# Patient Record
Sex: MALE | Race: WHITE | Employment: FULL TIME | URBAN - METROPOLITAN AREA
[De-identification: names, ages, dates, MRNs, and addresses within clinical notes are randomized per-mention and may not be internally consistent; named-entity substitution may affect disease eponyms.]

---
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
## 2019-07-22 ENCOUNTER — OFFICE VISIT (OUTPATIENT)
Dept: FAMILY MEDICINE CLINIC | Facility: CLINIC | Age: 19
End: 2019-07-22
Payer: COMMERCIAL

## 2019-07-22 VITALS
SYSTOLIC BLOOD PRESSURE: 100 MMHG | HEART RATE: 60 BPM | TEMPERATURE: 99 F | DIASTOLIC BLOOD PRESSURE: 68 MMHG | BODY MASS INDEX: 28 KG/M2 | WEIGHT: 188.19 LBS | OXYGEN SATURATION: 99 % | RESPIRATION RATE: 18 BRPM

## 2019-07-22 DIAGNOSIS — J45.909 MODERATE ASTHMA, UNSPECIFIED WHETHER COMPLICATED, UNSPECIFIED WHETHER PERSISTENT: Primary | ICD-10-CM

## 2019-07-22 DIAGNOSIS — R63.4 WEIGHT LOSS: ICD-10-CM

## 2019-07-22 DIAGNOSIS — J30.2 SEASONAL ALLERGIES: ICD-10-CM

## 2019-07-22 PROCEDURE — 99213 OFFICE O/P EST LOW 20 MIN: CPT | Performed by: NURSE PRACTITIONER

## 2019-07-22 RX ORDER — ALBUTEROL SULFATE 2.5 MG/3ML
2.5 SOLUTION RESPIRATORY (INHALATION) EVERY 4 HOURS PRN
Qty: 1 CONTAINER | Refills: 3 | Status: SHIPPED | OUTPATIENT
Start: 2019-07-22 | End: 2019-07-22

## 2019-07-22 RX ORDER — ALBUTEROL SULFATE 90 UG/1
2 AEROSOL, METERED RESPIRATORY (INHALATION) EVERY 4 HOURS PRN
Qty: 1 INHALER | Refills: 2 | Status: SHIPPED | OUTPATIENT
Start: 2019-07-22 | End: 2019-07-22

## 2019-07-22 RX ORDER — FLUTICASONE PROPIONATE 50 MCG
2 SPRAY, SUSPENSION (ML) NASAL DAILY
Qty: 1 BOTTLE | Refills: 0 | Status: SHIPPED | OUTPATIENT
Start: 2019-07-22 | End: 2019-08-21

## 2019-07-22 RX ORDER — PREDNISONE 20 MG/1
TABLET ORAL
Qty: 10 TABLET | Refills: 0 | Status: SHIPPED | OUTPATIENT
Start: 2019-07-22 | End: 2019-09-27 | Stop reason: ALTCHOICE

## 2019-07-22 RX ORDER — ALBUTEROL SULFATE 2.5 MG/3ML
2.5 SOLUTION RESPIRATORY (INHALATION) EVERY 4 HOURS PRN
Qty: 1 CONTAINER | Refills: 3 | Status: SHIPPED | OUTPATIENT
Start: 2019-07-22

## 2019-07-22 RX ORDER — ALBUTEROL SULFATE 90 UG/1
2 AEROSOL, METERED RESPIRATORY (INHALATION) EVERY 4 HOURS PRN
Qty: 1 INHALER | Refills: 2 | Status: SHIPPED | OUTPATIENT
Start: 2019-07-22

## 2019-07-22 NOTE — PATIENT INSTRUCTIONS
Understanding Asthma    Asthma causes swelling and narrowing of the airways in your lungs. Medical experts are not exactly sure what causes asthma. It is believed to be caused by a mix of inherited and environmental factors.   Healthy lungs  Inside the pradip Moderate flare-ups  When sensitive airways are irritated by a trigger, the muscles around the airways tighten. The lining of the airways swells. Thick, sticky mucus increases and partly clogs the airways.  All of this makes you work harder to keep breathing

## 2019-07-22 NOTE — PROGRESS NOTES
CHIEF COMPLAINT:   \" Patient presents with:  Asthma    HPI:   Nancy Garcia is a 20year old male who presents with a hx of an exacerbation of Asthma. Mother accompanies patient.   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 Dispense: 1 Inhaler; Refill: 2  - albuterol sulfate (2.5 MG/3ML) 0.083% Inhalation Nebu Soln; Take 3 mL (2.5 mg total) by nebulization every 4 (four) hours as needed for Wheezing.   Dispense: 1 Container; Refill: 3    2

## 2019-09-27 ENCOUNTER — OFFICE VISIT (OUTPATIENT)
Dept: FAMILY MEDICINE CLINIC | Facility: CLINIC | Age: 19
End: 2019-09-27
Payer: COMMERCIAL

## 2019-09-27 VITALS
TEMPERATURE: 98 F | BODY MASS INDEX: 27.7 KG/M2 | OXYGEN SATURATION: 99 % | RESPIRATION RATE: 16 BRPM | WEIGHT: 187 LBS | HEIGHT: 69 IN | HEART RATE: 82 BPM | DIASTOLIC BLOOD PRESSURE: 70 MMHG | SYSTOLIC BLOOD PRESSURE: 110 MMHG

## 2019-09-27 DIAGNOSIS — R09.81 NASAL CONGESTION: Primary | ICD-10-CM

## 2019-09-27 PROCEDURE — 99213 OFFICE O/P EST LOW 20 MIN: CPT | Performed by: PHYSICIAN ASSISTANT

## 2019-09-27 RX ORDER — FLUTICASONE PROPIONATE 50 MCG
SPRAY, SUSPENSION (ML) NASAL
Qty: 1 BOTTLE | Refills: 0 | Status: SHIPPED | OUTPATIENT
Start: 2019-09-27

## 2019-09-27 NOTE — PROGRESS NOTES
CHIEF COMPLAINT:   Patient presents with:  Nasal Congestion      HPI:   Natasha Weiner is a 20year old male who presents for nasal congestion symptoms for 2 days. Symptoms have progressed into mild sinus pressure mostly at the bridge of the nose. 2004    for undescended testes      Family History   Problem Relation Age of Onset   • Hypertension Maternal Grandmother    • Diabetes Maternal Grandmother    • Hypertension Maternal Grandfather    • Other (Other[other]) Brother         anxiety, depression Refills for this Visit:  Requested Prescriptions     Signed Prescriptions Disp Refills   • Fluticasone Propionate (FLONASE) 50 MCG/ACT Nasal Suspension 1 Bottle 0     Si sprays in each nostril once daily.    • Loratadine-Pseudoephedrine ER (CLARITIN-D 1

## 2020-03-24 DIAGNOSIS — J45.909 MODERATE ASTHMA, UNSPECIFIED WHETHER COMPLICATED, UNSPECIFIED WHETHER PERSISTENT: ICD-10-CM

## 2020-03-24 NOTE — TELEPHONE ENCOUNTER
ALL protocols failed     Last RX not filled in office     Please advise,    LOV:3/21/17 SD  FOV:none on file   LAST RX:7/22/19 2 puffs into the lungs every 4 hours as needed 1 inhaler 2 refills   LAST LABS:12/7/18 strep A Assay  PER PROTOCOL: to provider

## 2020-03-24 NOTE — TELEPHONE ENCOUNTER
Attempted to reach patient. Last ov >3 yrs ago. Voicemail not set up and no answer x 3 so far this am.  Would like to speak to patient about need for ov once covid restrictions are lifted.    Will attempt to reach patient later today before refilling pre

## 2020-03-26 RX ORDER — ALBUTEROL SULFATE 90 UG/1
AEROSOL, METERED RESPIRATORY (INHALATION)
Qty: 1 INHALER | Refills: 0 | OUTPATIENT
Start: 2020-03-26

## 2020-03-26 NOTE — TELEPHONE ENCOUNTER
Multiple attempts to reach patient today. Unable to leave voice mail. Will ask patient to call the office through the pharmacy. Inhaler was refused at this time.    Last ov March 2017

## 2020-05-27 ENCOUNTER — HOSPITAL ENCOUNTER (EMERGENCY)
Facility: HOSPITAL | Age: 20
Discharge: HOME | End: 2020-05-27
Attending: EMERGENCY MEDICINE
Payer: COMMERCIAL

## 2020-05-27 VITALS
RESPIRATION RATE: 18 BRPM | TEMPERATURE: 99 F | DIASTOLIC BLOOD PRESSURE: 106 MMHG | OXYGEN SATURATION: 99 % | WEIGHT: 196 LBS | HEIGHT: 69 IN | HEART RATE: 91 BPM | BODY MASS INDEX: 29.03 KG/M2 | SYSTOLIC BLOOD PRESSURE: 136 MMHG

## 2020-05-27 DIAGNOSIS — L30.9 ECZEMA, UNSPECIFIED TYPE: Primary | ICD-10-CM

## 2020-05-27 DIAGNOSIS — L20.9 ATOPIC DERMATITIS, UNSPECIFIED TYPE: ICD-10-CM

## 2020-05-27 PROCEDURE — 99283 EMERGENCY DEPT VISIT LOW MDM: CPT

## 2020-05-27 RX ORDER — DIPHENHYDRAMINE HCL 25 MG
25 TABLET ORAL EVERY 6 HOURS PRN
Qty: 30 TABLET | Refills: 0 | Status: SHIPPED | OUTPATIENT
Start: 2020-05-27

## 2020-05-27 RX ORDER — PREDNISONE 20 MG/1
60 TABLET ORAL ONCE
Status: COMPLETED | OUTPATIENT
Start: 2020-05-27 | End: 2020-05-27

## 2020-05-27 RX ORDER — PREDNISONE 20 MG/1
40 TABLET ORAL DAILY
Qty: 10 TABLET | Refills: 0 | Status: SHIPPED | OUTPATIENT
Start: 2020-05-27 | End: 2020-06-01

## 2020-05-27 RX ORDER — DIPHENHYDRAMINE HCL 50 MG
50 CAPSULE ORAL ONCE
Status: COMPLETED | OUTPATIENT
Start: 2020-05-27 | End: 2020-05-27

## 2020-05-27 NOTE — ED INITIAL ASSESSMENT (HPI)
Pt presents to ed ambulatory with steady gait c/o generalized rash that has worsened over the past 3 weeks. Pt states rash is consistent with past boughts of eczema. Pt is a&ox4, moves all extremities well, resps easy.

## 2020-05-27 NOTE — ED PROVIDER NOTES
Patient Seen in: [facility] Emergency Department      History   Patient presents with:  Rash Skin Problem    Stated Complaint: generalized rash worsening over past 3 weeks, states hx of eczema    HPI    24-year-old presents to the emergency departme Exam  Vitals signs and nursing note reviewed. Constitutional:       General: He is not in acute distress. Appearance: He is well-developed. He is not diaphoretic.       Comments: Actively scratching young man excoriating himself and scratching in all normal.             ED Course   Labs Reviewed - No data to display       Yamilka and discussed with the patient why his choice to use old spice body wash multiple times daily with hot showers is not advisable with significant eczema.   Discussed more viable

## 2021-06-07 ENCOUNTER — HOSPITAL ENCOUNTER (OUTPATIENT)
Age: 21
Discharge: HOME | End: 2021-06-07
Payer: COMMERCIAL

## 2021-06-07 VITALS
BODY MASS INDEX: 26.66 KG/M2 | TEMPERATURE: 97 F | SYSTOLIC BLOOD PRESSURE: 111 MMHG | WEIGHT: 180 LBS | HEART RATE: 78 BPM | DIASTOLIC BLOOD PRESSURE: 68 MMHG | HEIGHT: 69 IN | RESPIRATION RATE: 14 BRPM | OXYGEN SATURATION: 96 %

## 2021-06-07 DIAGNOSIS — L50.0 ALLERGIC URTICARIA: Primary | ICD-10-CM

## 2021-06-07 PROCEDURE — 99213 OFFICE O/P EST LOW 20 MIN: CPT | Performed by: NURSE PRACTITIONER

## 2021-06-07 RX ORDER — PREDNISONE 20 MG/1
40 TABLET ORAL DAILY
Qty: 10 TABLET | Refills: 0 | Status: SHIPPED | OUTPATIENT
Start: 2021-06-07 | End: 2021-06-12

## 2021-06-07 RX ORDER — FAMOTIDINE 20 MG/1
20 TABLET ORAL 2 TIMES DAILY
Qty: 10 TABLET | Refills: 0 | Status: SHIPPED | OUTPATIENT
Start: 2021-06-07 | End: 2021-06-12

## 2021-06-07 NOTE — ED PROVIDER NOTES
Patient Seen in: [facility]mediate [address] [place] [address]      History   Patient presents with:  Rash Skin Problem    Stated Complaint: RASH ON ARM    HPI/Subjective: This is a 54-year-old male with below stated medical history.   Presents to immediate care for itchy rash Vitals [06/07/21 1157]   /68   Pulse 78   Resp 14   Temp 97.4 °F (36.3 °C)   Temp src Temporal   SpO2 96 %   O2 Device None (Room air)       Current:/68   Pulse 78   Temp 97.4 °F (36.3 °C) (Temporal)   Resp 14   Ht 175.3 cm (5' 9\")   Wt 81.6 k normal.         Behavior: Behavior normal.               ED Course   Labs Reviewed - No data to display       DC home. MDM      Vital signs stable. Patient is well-appearing and nontoxic-appearing.   Presents to immediate care for allergic reaction

## 2021-09-29 ENCOUNTER — APPOINTMENT (OUTPATIENT)
Dept: GENERAL RADIOLOGY | Age: 21
End: 2021-09-29
Attending: NURSE PRACTITIONER
Payer: COMMERCIAL

## 2021-09-29 ENCOUNTER — HOSPITAL ENCOUNTER (OUTPATIENT)
Age: 21
Discharge: HOME | End: 2021-09-29
Payer: COMMERCIAL

## 2021-09-29 VITALS
TEMPERATURE: 98 F | RESPIRATION RATE: 18 BRPM | BODY MASS INDEX: 26.07 KG/M2 | HEART RATE: 59 BPM | HEIGHT: 68 IN | SYSTOLIC BLOOD PRESSURE: 112 MMHG | WEIGHT: 172 LBS | DIASTOLIC BLOOD PRESSURE: 71 MMHG | OXYGEN SATURATION: 98 %

## 2021-09-29 DIAGNOSIS — M25.511 ACUTE PAIN OF RIGHT SHOULDER: Primary | ICD-10-CM

## 2021-09-29 PROCEDURE — 99213 OFFICE O/P EST LOW 20 MIN: CPT | Performed by: NURSE PRACTITIONER

## 2021-09-29 PROCEDURE — 73030 X-RAY EXAM OF SHOULDER: CPT | Performed by: NURSE PRACTITIONER

## 2021-09-29 NOTE — ED PROVIDER NOTES
Patient Seen in: [facility]      History   Patient presents with:  Arm or Hand Injury    Stated Complaint: right shoulder pain    Subjective:   25-year-old male presents to complaints of pain and popping-like sensation to the distal aspect of reviewed. Constitutional:       Appearance: Normal appearance. HENT:      Head: Normocephalic. Mouth/Throat:      Mouth: Mucous membranes are moist.   Cardiovascular:      Rate and Rhythm: Normal rate.    Pulmonary:      Effort: Pulmonary effort is symptoms improve. Patient verbalized understand agree with plan of care.                              Disposition and Plan     Clinical Impression:  Acute pain of right shoulder  (primary encounter diagnosis)     Disposition:  Discharge  9/29/2021  1:10 pm

## 2021-10-12 ENCOUNTER — TELEPHONE (OUTPATIENT)
Dept: INTERNAL MEDICINE CLINIC | Facility: CLINIC | Age: 21
End: 2021-10-12

## 2021-10-12 NOTE — TELEPHONE ENCOUNTER
Last OV with SD in 2017. Please call pt to see if we are still PCP, if yes, due for CPE. If no then remove all providers in our office from care team.  If unable to reach after 3 tries then message back to me.

## 2021-12-28 ENCOUNTER — HOSPITAL ENCOUNTER (OUTPATIENT)
Age: 21
Discharge: HOME | End: 2021-12-28
Payer: COMMERCIAL

## 2021-12-28 VITALS
TEMPERATURE: 98 F | RESPIRATION RATE: 18 BRPM | HEART RATE: 79 BPM | WEIGHT: 178 LBS | DIASTOLIC BLOOD PRESSURE: 61 MMHG | SYSTOLIC BLOOD PRESSURE: 130 MMHG | OXYGEN SATURATION: 98 % | BODY MASS INDEX: 27 KG/M2

## 2021-12-28 DIAGNOSIS — R51.9 HEADACHE: Primary | ICD-10-CM

## 2021-12-28 DIAGNOSIS — B34.9 VIRAL ILLNESS: ICD-10-CM

## 2021-12-28 PROCEDURE — 99213 OFFICE O/P EST LOW 20 MIN: CPT | Performed by: NURSE PRACTITIONER

## 2021-12-28 RX ORDER — ONDANSETRON 4 MG/1
4 TABLET, ORALLY DISINTEGRATING ORAL EVERY 4 HOURS PRN
Qty: 10 TABLET | Refills: 0 | Status: SHIPPED | OUTPATIENT
Start: 2021-12-28 | End: 2022-01-04

## 2021-12-29 NOTE — ED PROVIDER NOTES
Patient Seen in: [facility] [address] [facility]      History   Patient presents with:  Headache    Stated Complaint: headache,possible fever    Subjective:   19-year-old male presents today with flulike symptoms. Symptoms started today.   Has had generalized fati congestion present. Mouth/Throat:      Pharynx: Uvula midline. Eyes:      Conjunctiva/sclera: Conjunctivae normal.      Pupils: Pupils are equal, round, and reactive to light. Cardiovascular:      Rate and Rhythm: Normal rate and regular rhythm.

## 2021-12-31 LAB — SARS-COV-2 RNA RESP QL NAA+PROBE: DETECTED

## 2022-02-16 ENCOUNTER — APPOINTMENT (OUTPATIENT)
Dept: GENERAL RADIOLOGY | Age: 22
End: 2022-02-16
Attending: NURSE PRACTITIONER

## 2022-02-16 ENCOUNTER — HOSPITAL ENCOUNTER (OUTPATIENT)
Age: 22
Discharge: HOME | End: 2022-02-16

## 2022-02-16 VITALS
SYSTOLIC BLOOD PRESSURE: 128 MMHG | DIASTOLIC BLOOD PRESSURE: 72 MMHG | RESPIRATION RATE: 16 BRPM | OXYGEN SATURATION: 97 % | HEART RATE: 72 BPM | HEIGHT: 68 IN | BODY MASS INDEX: 26.22 KG/M2 | TEMPERATURE: 98 F | WEIGHT: 173 LBS

## 2022-02-16 DIAGNOSIS — M79.671 FOOT PAIN, RIGHT: Primary | ICD-10-CM

## 2022-02-16 PROCEDURE — 73630 X-RAY EXAM OF FOOT: CPT | Performed by: NURSE PRACTITIONER

## 2022-02-16 PROCEDURE — A6449 LT COMPRES BAND >=3" <5"/YD: HCPCS | Performed by: NURSE PRACTITIONER

## 2022-02-16 PROCEDURE — 99213 OFFICE O/P EST LOW 20 MIN: CPT | Performed by: NURSE PRACTITIONER

## 2022-03-21 ENCOUNTER — HOSPITAL ENCOUNTER (OUTPATIENT)
Age: 22
Discharge: HOME | End: 2022-03-21
Payer: COMMERCIAL

## 2022-03-21 VITALS
RESPIRATION RATE: 16 BRPM | TEMPERATURE: 97 F | HEART RATE: 69 BPM | HEIGHT: 68 IN | SYSTOLIC BLOOD PRESSURE: 130 MMHG | OXYGEN SATURATION: 100 % | DIASTOLIC BLOOD PRESSURE: 64 MMHG | WEIGHT: 190 LBS | BODY MASS INDEX: 28.79 KG/M2

## 2022-03-21 DIAGNOSIS — R09.81 NASAL CONGESTION: Primary | ICD-10-CM

## 2022-03-21 LAB
POCT INFLUENZA A: NEGATIVE
SARS-COV-2 RNA RESP QL NAA+PROBE: NOT DETECTED

## 2022-03-21 PROCEDURE — 87502 INFLUENZA DNA AMP PROBE: CPT | Performed by: PHYSICIAN ASSISTANT

## 2022-03-21 PROCEDURE — 99213 OFFICE O/P EST LOW 20 MIN: CPT | Performed by: PHYSICIAN ASSISTANT

## 2022-03-21 PROCEDURE — U0002 COVID-19 LAB TEST NON-CDC: HCPCS | Performed by: PHYSICIAN ASSISTANT

## 2022-03-21 RX ORDER — METHYLPREDNISOLONE 4 MG/1
TABLET ORAL
Qty: 1 EACH | Refills: 0 | Status: SHIPPED | OUTPATIENT
Start: 2022-03-21

## 2022-03-21 RX ORDER — FLUTICASONE PROPIONATE 50 MCG
2 SPRAY, SUSPENSION (ML) NASAL DAILY
Qty: 16 G | Refills: 0 | Status: SHIPPED | OUTPATIENT
Start: 2022-03-21 | End: 2022-04-20

## 2023-01-01 ENCOUNTER — HOSPITAL ENCOUNTER (OUTPATIENT)
Age: 23
Discharge: HOME | End: 2023-01-01
Payer: COMMERCIAL

## 2023-01-01 VITALS
TEMPERATURE: 98 F | RESPIRATION RATE: 18 BRPM | OXYGEN SATURATION: 99 % | SYSTOLIC BLOOD PRESSURE: 132 MMHG | DIASTOLIC BLOOD PRESSURE: 73 MMHG | HEART RATE: 66 BPM

## 2023-01-01 DIAGNOSIS — S39.012A STRAIN OF LUMBAR REGION, INITIAL ENCOUNTER: Primary | ICD-10-CM

## 2023-01-01 DIAGNOSIS — M62.830 BACK SPASM: ICD-10-CM

## 2023-01-01 RX ORDER — KETOROLAC TROMETHAMINE 30 MG/ML
30 INJECTION, SOLUTION INTRAMUSCULAR; INTRAVENOUS ONCE
Status: COMPLETED | OUTPATIENT
Start: 2023-01-01 | End: 2023-01-01

## 2023-01-01 RX ORDER — METHYLPREDNISOLONE 4 MG/1
TABLET ORAL
Qty: 1 EACH | Refills: 0 | Status: SHIPPED | OUTPATIENT
Start: 2023-01-01

## 2023-01-01 RX ORDER — DIAZEPAM 5 MG/1
5 TABLET ORAL ONCE
Status: COMPLETED | OUTPATIENT
Start: 2023-01-01 | End: 2023-01-01

## 2023-01-01 RX ORDER — CYCLOBENZAPRINE HCL 10 MG
10 TABLET ORAL 3 TIMES DAILY PRN
Qty: 20 TABLET | Refills: 0 | Status: SHIPPED | OUTPATIENT
Start: 2023-01-01 | End: 2023-01-08

## 2023-01-01 NOTE — DISCHARGE INSTRUCTIONS
Take the medications as prescribed. Please read the attached discharge instructions. Go to your nearest ER for new or worsening symptoms.

## 2023-01-01 NOTE — ED INITIAL ASSESSMENT (HPI)
Pt with c/o of lower back pain x 1 week that causes shooting sharp pain down the right leg. Standing causes lower back pressure and states he needs to talk his time with walking to reduce pain. There is no pain when laying down flat. Pt rates pain 9/10. Has been using Icy Hot lidocaine patch for pain relief. Denies any urinary symptoms no middle or upper back pain.

## 2023-01-13 ENCOUNTER — OFFICE VISIT (OUTPATIENT)
Dept: INTERNAL MEDICINE CLINIC | Facility: CLINIC | Age: 23
End: 2023-01-13
Payer: COMMERCIAL

## 2023-01-13 ENCOUNTER — HOSPITAL ENCOUNTER (OUTPATIENT)
Dept: GENERAL RADIOLOGY | Age: 23
Discharge: HOME | End: 2023-01-13
Attending: INTERNAL MEDICINE
Payer: COMMERCIAL

## 2023-01-13 VITALS
TEMPERATURE: 97 F | BODY MASS INDEX: 27.74 KG/M2 | WEIGHT: 183 LBS | DIASTOLIC BLOOD PRESSURE: 72 MMHG | HEIGHT: 68 IN | HEART RATE: 66 BPM | SYSTOLIC BLOOD PRESSURE: 112 MMHG

## 2023-01-13 DIAGNOSIS — M62.838 MUSCLE SPASM: ICD-10-CM

## 2023-01-13 DIAGNOSIS — M54.41 ACUTE RIGHT-SIDED LOW BACK PAIN WITH RIGHT-SIDED SCIATICA: Primary | ICD-10-CM

## 2023-01-13 DIAGNOSIS — M54.41 ACUTE RIGHT-SIDED LOW BACK PAIN WITH RIGHT-SIDED SCIATICA: ICD-10-CM

## 2023-01-13 PROCEDURE — 3078F DIAST BP <80 MM HG: CPT | Performed by: INTERNAL MEDICINE

## 2023-01-13 PROCEDURE — 3008F BODY MASS INDEX DOCD: CPT | Performed by: INTERNAL MEDICINE

## 2023-01-13 PROCEDURE — 99213 OFFICE O/P EST LOW 20 MIN: CPT | Performed by: INTERNAL MEDICINE

## 2023-01-13 PROCEDURE — 72100 X-RAY EXAM L-S SPINE 2/3 VWS: CPT | Performed by: INTERNAL MEDICINE

## 2023-01-13 PROCEDURE — 3074F SYST BP LT 130 MM HG: CPT | Performed by: INTERNAL MEDICINE

## 2023-01-13 RX ORDER — CYCLOBENZAPRINE HCL 10 MG
5 TABLET ORAL 3 TIMES DAILY PRN
Qty: 21 TABLET | Refills: 0 | Status: SHIPPED | OUTPATIENT
Start: 2023-01-13 | End: 2023-01-20

## 2023-11-30 ENCOUNTER — OFFICE VISIT (OUTPATIENT)
Dept: FAMILY MEDICINE CLINIC | Facility: CLINIC | Age: 23
End: 2023-11-30
Payer: COMMERCIAL

## 2023-11-30 VITALS
SYSTOLIC BLOOD PRESSURE: 117 MMHG | DIASTOLIC BLOOD PRESSURE: 83 MMHG | TEMPERATURE: 97 F | HEART RATE: 57 BPM | WEIGHT: 186 LBS | BODY MASS INDEX: 28.19 KG/M2 | OXYGEN SATURATION: 98 % | HEIGHT: 68 IN

## 2023-11-30 DIAGNOSIS — J45.21 MILD INTERMITTENT ASTHMA WITH EXACERBATION: ICD-10-CM

## 2023-11-30 DIAGNOSIS — R05.1 ACUTE COUGH: Primary | ICD-10-CM

## 2023-11-30 LAB
OPERATOR ID: NORMAL
POCT LOT NUMBER: NORMAL
RAPID SARS-COV-2 BY PCR: NOT DETECTED

## 2023-11-30 PROCEDURE — 3074F SYST BP LT 130 MM HG: CPT | Performed by: FAMILY MEDICINE

## 2023-11-30 PROCEDURE — 3008F BODY MASS INDEX DOCD: CPT | Performed by: FAMILY MEDICINE

## 2023-11-30 PROCEDURE — 3079F DIAST BP 80-89 MM HG: CPT | Performed by: FAMILY MEDICINE

## 2023-11-30 PROCEDURE — 99214 OFFICE O/P EST MOD 30 MIN: CPT | Performed by: FAMILY MEDICINE

## 2023-11-30 PROCEDURE — U0002 COVID-19 LAB TEST NON-CDC: HCPCS | Performed by: FAMILY MEDICINE

## 2023-11-30 RX ORDER — ALBUTEROL SULFATE 90 UG/1
2 AEROSOL, METERED RESPIRATORY (INHALATION) EVERY 4 HOURS PRN
Qty: 1 EACH | Refills: 0 | Status: SHIPPED | OUTPATIENT
Start: 2023-11-30

## 2023-11-30 RX ORDER — PREDNISONE 20 MG/1
40 TABLET ORAL DAILY
Qty: 10 TABLET | Refills: 0 | Status: SHIPPED | OUTPATIENT
Start: 2023-11-30 | End: 2023-12-05

## 2023-11-30 RX ORDER — ALBUTEROL SULFATE 2.5 MG/3ML
2.5 SOLUTION RESPIRATORY (INHALATION) ONCE
Status: SHIPPED | OUTPATIENT
Start: 2023-11-30

## 2024-02-20 ENCOUNTER — OFFICE VISIT (OUTPATIENT)
Dept: INTERNAL MEDICINE CLINIC | Facility: CLINIC | Age: 24
End: 2024-02-20
Payer: COMMERCIAL

## 2024-02-20 VITALS
WEIGHT: 182.63 LBS | HEIGHT: 68 IN | SYSTOLIC BLOOD PRESSURE: 118 MMHG | TEMPERATURE: 98 F | BODY MASS INDEX: 27.68 KG/M2 | DIASTOLIC BLOOD PRESSURE: 72 MMHG | HEART RATE: 78 BPM | RESPIRATION RATE: 16 BRPM | OXYGEN SATURATION: 98 %

## 2024-02-20 DIAGNOSIS — J45.21 MILD INTERMITTENT ASTHMA WITH ACUTE EXACERBATION (HCC): Primary | ICD-10-CM

## 2024-02-20 PROCEDURE — 99213 OFFICE O/P EST LOW 20 MIN: CPT | Performed by: INTERNAL MEDICINE

## 2024-02-20 PROCEDURE — 3008F BODY MASS INDEX DOCD: CPT | Performed by: INTERNAL MEDICINE

## 2024-02-20 PROCEDURE — 3078F DIAST BP <80 MM HG: CPT | Performed by: INTERNAL MEDICINE

## 2024-02-20 PROCEDURE — 3074F SYST BP LT 130 MM HG: CPT | Performed by: INTERNAL MEDICINE

## 2024-02-20 RX ORDER — ALBUTEROL SULFATE 2.5 MG/3ML
2.5 SOLUTION RESPIRATORY (INHALATION) EVERY 6 HOURS PRN
Qty: 30 EACH | Refills: 0 | Status: SHIPPED | OUTPATIENT
Start: 2024-02-20

## 2024-02-20 RX ORDER — FLUTICASONE PROPIONATE 110 UG/1
1 AEROSOL, METERED RESPIRATORY (INHALATION) 2 TIMES DAILY
Qty: 1 EACH | Refills: 1 | Status: SHIPPED | OUTPATIENT
Start: 2024-02-20

## 2024-02-20 RX ORDER — ALBUTEROL SULFATE 90 UG/1
1 AEROSOL, METERED RESPIRATORY (INHALATION) EVERY 4 HOURS PRN
Qty: 1 EACH | Refills: 11 | Status: SHIPPED | OUTPATIENT
Start: 2024-02-20

## 2024-02-20 RX ORDER — FLUTICASONE PROPIONATE 50 MCG
1 SPRAY, SUSPENSION (ML) NASAL DAILY
Qty: 1 EACH | Refills: 11 | Status: SHIPPED | OUTPATIENT
Start: 2024-02-20

## 2024-02-20 NOTE — PROGRESS NOTES
Tad Willis  1/15/2000    No chief complaint on file.    SUBJECTIVE   Tad Willis is a 24 year old male who presents for asthma follow up.    Has asthma symptoms whitney when laying supine in bed, improved when laying on side. There is assoc wheezing and a dry cough. He also admits to nocturnal awakenings.     This has been happening for the last 3.5 weeks. Denies any acute illnesses.    Admits to environmental allergies. Has only been taking intranasal glucocorticoid intermittently.    Requesting Rx for nebulizer.    Review of Systems   Review of Systems   No f/c/chest pain. No abd pain/n/v/d. No ha or dizziness. No numbness, tingling, or weakness.     OBJECTIVE:   /72   Pulse 78   Temp 97.5 °F (36.4 °C) (Temporal)   Resp 16   Ht 5' 8\" (1.727 m)   Wt 182 lb 9.6 oz (82.8 kg)   SpO2 98%   BMI 27.76 kg/m²   Physical Exam   Constitutional: Oriented to person, place, and time. No distress.   Cardiovascular: Normal rate, regular rhythm and intact distal pulses.  No murmur, rubs or gallops.   Pulmonary/Chest: Effort normal. No respiratory distress. Scattered end expiratory wheezes.  Skin: Skin is warm and dry. No rash on visualized skin.    Lab Results   Component Value Date    GLU 94 02/06/2017    BUN 19 02/06/2017    CREATSERUM 1.06 (H) 02/06/2017    CA 8.7 02/06/2017     02/06/2017    K 3.7 02/06/2017     02/06/2017    CO2 28.0 02/06/2017      Lab Results   Component Value Date    WBC 20.3 (H) 02/06/2017    RBC 4.82 02/06/2017    HGB 13.7 02/06/2017    HCT 41.7 02/06/2017    MCV 86.5 02/06/2017    MCH 28.4 02/06/2017    MCHC 32.9 02/06/2017    RDW 13.2 02/06/2017    .0 02/06/2017      No results found for: \"T4F\", \"TSH\", \"TSHT4\"     ASSESSMENT AND PLAN:       ICD-10-CM    1. Mild intermittent asthma with acute exacerbation ([facility])  J45.21 albuterol 108 (90 Base) MCG/ACT Inhalation Aero Soln     fluticasone propionate (FLONASE) 50 MCG/ACT Nasal Suspension     Nebulizer     albuterol  (2.5 MG/3ML) 0.083% Inhalation Nebu Soln     fluticasone propionate 110 MCG/ACT Inhalation Aerosol      Mild exacerbation of asthma. Will start maintenance ICS at least for the next several weeks. Patient to follow up in the next 2-4 weeks. He was instructed to use intranasal steroid and take antihistamine daily for allergic component.      The patient indicates understanding of these issues and agrees to the plan.  The patient is asked to return or present to the emergency room for worsening of symptoms.    TODAY'S ORDERS     No orders of the defined types were placed in this encounter.      Meds & Refills:  Requested Prescriptions     Signed Prescriptions Disp Refills    albuterol 108 (90 Base) MCG/ACT Inhalation Aero Soln 1 each 11     Sig: Inhale 1 puff into the lungs every 4 (four) hours as needed.    fluticasone propionate (FLONASE) 50 MCG/ACT Nasal Suspension 1 each 11     Si spray by Nasal route daily. 2 sprays in each nostril once daily.    albuterol (2.5 MG/3ML) 0.083% Inhalation Nebu Soln 30 each 0     Sig: Take 3 mL (2.5 mg total) by nebulization every 6 (six) hours as needed for Wheezing.    fluticasone propionate 110 MCG/ACT Inhalation Aerosol 1 each 1     Sig: Inhale 1 puff into the lungs 2 (two) times daily.       Imaging & Consults:  EXT DME NEBULIZER     No follow-ups on file.  There are no Patient Instructions on file for this visit.    All questions were answered and the patient agrees with the plan.     Thank you,  Thao Francois, DO

## 2025-02-24 DIAGNOSIS — J45.21 MILD INTERMITTENT ASTHMA WITH ACUTE EXACERBATION (HCC): ICD-10-CM

## 2025-02-27 RX ORDER — ALBUTEROL SULFATE 90 UG/1
1 INHALANT RESPIRATORY (INHALATION) EVERY 4 HOURS PRN
Qty: 8.5 G | Refills: 0 | Status: SHIPPED | OUTPATIENT
Start: 2025-02-27

## 2025-02-27 NOTE — TELEPHONE ENCOUNTER
Please review. Protocol Failed; No Protocol    No future appointments.    [employer] message sent to patient to schedule an office visit with Provider and/or  Routed to Patient  for assistance with appointment.

## 2025-04-15 ENCOUNTER — TELEPHONE (OUTPATIENT)
Dept: INTERNAL MEDICINE CLINIC | Facility: CLINIC | Age: 25
End: 2025-04-15

## (undated) NOTE — LETTER
Date: 11/30/2023    Patient Name: Chun Weir          To Whom it may concern: This letter has been written at the patient's request. The above patient was seen at the [facility] for treatment of a medical condition. This patient should be excused from attending work 11/30/23 due to illness. He was seen in our clinic and is under our care.           Sincerely,      Catarina Suero PA-C

## (undated) NOTE — Clinical Note
Dear Dr. Keely Esparza you for referring Melinda Mckinnon to the [facility] in [place]. Claire Allred NP

## (undated) NOTE — MR AVS SNAPSHOT
[facility] [address] [phone]               Thank you for choosing us for your health care visit with CHERRIE Marques.   We are glad to serve you and happy to provide you with this summar Generic drug:  Fexofenadine HCl   Take 180 mg by mouth daily. azithromycin 250 MG Tabs   Take two tablets by mouth today, then one tablet daily.    Commonly known as:  Shirin Velazquez                Where to Get Your Medications      These medicat

## (undated) NOTE — LETTER
Date & Time: 9/29/2021, 1:09 PM  Patient: Lamberto Gauthier  Encounter Provider(s):    ALFRED Morales       To Whom It May Concern:    Lauren Hummel was seen and treated in our department on 9/29/2021.  He may return to work in 3 days if symptoms

## (undated) NOTE — LETTER
Date: 12/7/2018    Patient Name: Abad Foster          To Whom it may concern: This letter has been written at the patient's request. The above patient was seen at the [facility] for treatment of a medical condition.     This patient s

## (undated) NOTE — Clinical Note
ASTHMA ACTION PLAN for Guillermo Gregory     : 1/15/2000     Date: 3/21/2017  Provider:  CHERRIE Tavera  Phone for doctor or clinic: [address] [facility], [address] [facility], [address] [place] [address] [phone]

## (undated) NOTE — MR AVS SNAPSHOT
[facility] [place]  [address] [facility] [address]  [phone]               Thank you for choosing us for your health care visit with CHERRIE Mena. We are glad to serve you and happy to provide you with this summary of your visit.   Ple bleeding, talk with your doctor before using these medicines.)  · Throat lozenges or numbing throat sprays can help reduce pain. Gargling with warm salt water will also help reduce throat pain.  For this, dissolve 1/2 teaspoon of salt in 1 glass of warm tyrell This list is accurate as of: 1/2/17  6:12 PM.  Always use your most recent med list.                Albuterol Sulfate  (90 BASE) MCG/ACT Aers   Inhale 2 puffs into the lungs every 6 (six) hours as needed for Wheezing or Shortness of Breath.    Common help them live a healthy active lifestyle.     To lead a healthy active life, families can strive to reach these goals:  o 5 servings of fruits and vegetables a day  o 4 servings of water a day  o 3 servings of low-fat dairy a day  o 2 or less hours of scre

## (undated) NOTE — ED AVS SNAPSHOT
[facility] Emergency Department    [place]  [address] [address]    Phone:  [phone]    Fax:  [phone] [facility]   MRN: [id number]    Department:  [facility] Emergency Department   Date of Visit:  2/6 IF THERE IS ANY CHANGE OR WORSENING OF YOUR CONDITION, CALL YOUR PRIMARY CARE PHYSICIAN AT ONCE OR RETURN IMMEDIATELY TO THE EMERGENCY DEPARTMENT.     If you have been prescribed any medication(s), please fill your prescription right away and begin taking t

## (undated) NOTE — ED AVS SNAPSHOT
[facility] Emergency Department    [place]  [address] [address]    Phone:  [phone]    Fax:  [phone] [facility]   MRN: [id number]    Department:  [facility] Emergency Department   Date of Visit:  2/6 Take 1 tablet (4 mg total) by mouth every 4 (four) hours as needed for Nausea.          CHANGE how you take these medications     albuterol sulfate (2.5 MG/3ML) 0.083% Nebu   Quantity:  30 ampule   Commonly known as:  VENTOLIN   Take 3 mL (2.5 mg total) by complete it. Thank you! You were examined and treated today on an urgent basis only. This was not a substitute for ongoing medical care. Often, one Emergency Department visit does not uncover every injury or illness.  If you have been referred to a pr [place] [address] (Mayur Brand) [address] [facility] [address] ([address] [facility] [phone]) [phone] [address] [facility] ([address]) [phone]                Additional Information       We are concerned Kiko will allow you to access patient instructions from your recent visit,  view other health information, and more. To sign up or find more information, go to [email or web address] and click on the Sign Up Now link in the [employer] box.      Enter

## (undated) NOTE — LETTER
Date: 1/13/2023    Patient Name: Odilia Gongora          To Whom it may concern: The above patient was seen at the [facility] for treatment of a medical condition. This patient should be excused from attending work/school from 1/12/2023 hrough 1/13/2023. The patient may return to work/school on Monday 1/16/2023 with the following limitations-- allow short breaks to sit and rest his back, avoid heavy lifting and prolonged periods of standing.         Sincerely,    [facility] [employer] Alessandro, DO

## (undated) NOTE — LETTER
Date: 7/22/2019    Patient Name: Rika Stack          To Whom it may concern: This letter has been written at the patient's request. The above patient was seen at the [facility] for treatment of a medical condition.     Norma Connell is earlene

## (undated) NOTE — LETTER
Date & Time: 5/27/2020, 12:48 AM  Patient: Micheal De Leon  Encounter Provider(s):    Santiago Navarrete MD       To Whom It May Concern:    Ratna Chaidezudsen was seen and treated in our department on 5/27/2020. He may return to work on 5/28/2020.     If you

## (undated) NOTE — LETTER
ASTHMA ACTION PLAN for Tad Willis     : 1/15/2000     Date: 2024  Provider:  Thao Francois DO  Phone for doctor or clinic: [facility], [address] [place] [id number] [phone]    ACT Score: 13      You can use the colors of a traffic light to help learn about your asthma medicines.      1. Green - Go! % of Personal Best Peak Flow Use controller medicine.   Breathing is good  No cough or wheeze  Can work and play Medicine How much to take When to take it    Medication Quantity Refills Start End   fluticasone propionate 110 MCG/ACT Inhalation Aerosol 1 each 1 2024 --   Sig:   Inhale 1 puff into the lungs 2 (two) times daily.     Route:   Inhalation           2. Yellow - Caution. 50-79% Personal Best Peak  Flow.  Use reliever medicine to keep an asthma attack from getting bad.   Cough  Wheezing  Tight Chest  Wake up at night Medicine How much to take When to take it    Medication Quantity Refills Start End   albuterol 108 (90 Base) MCG/ACT Inhalation Aero Soln 1 each 11 2024 --   Sig:   Inhale 1 puff into the lungs every 4 (four) hours as needed.            Additional instructions         3. Red - Stop! Danger!  <50% Personal Best Peak  Flow. Take these medications until  Get help from a doctor   Medicine not helping  Breathing is hard and fast  Nose opens wide  Can't walk  Ribs show  Can't talk well Medicine How much to take When to take it    Go to the nearest Emergency Room/Department right now!      Additional Instructions If your symptoms do not improve and you cannot contact your doctor, [facility] or call [facility] immediately!     [x] Asthma Action Plan reviewed with patient (and caregiver if necessary) and a copy of the plan was given to the patient/caregiver.   [] Asthma Action Plan reviewed with patient (and caregiver if necessary) on the phone and mailed copy to patient or submitted via [employer].      Signatures:  Provider  Thao Francois, DO   Patient Caretaker

## (undated) NOTE — LETTER
Date: 9/27/2019    Patient Name: Tawana Sepulveda          To Whom it may concern: This letter has been written at the patient's request. The above patient was seen at the [facility] for treatment of a medical condition.   Please excuse hi

## (undated) NOTE — LETTER
Date: 7/22/2019    Patient Name: Naomi Galvan          To Whom it may concern:    Please excuse Mrs. Ileana Woodward from work. She had to bring her ill son to be seen and treated at this clinic.       Sincerely,    ALFRED Ferguson

## (undated) NOTE — LETTER
Date: 1/13/2023    Patient Name: Fatemeh Baig          To Whom it may concern: The above patient was seen at the [facility] for treatment of a medical condition. This patient should be excused from attending work/school from 1/12/2023 through 1/13/2023. The patient may return to work/school on Monday 1/16/2023 with the following limitations-- allow short breaks to sit and rest his back, avoid heavy lifting and prolonged periods of standing.       Sincerely,    [facility] [employer] Alessandro, DO

## (undated) NOTE — LETTER
[facility], [address] [facility], [facility], [address] [place] [id number]  [facility]: [phone]  FAX: [phone]    11/9/2021  Jaquelin Adams [facility]  [address] [place] [address]  1/15/2000  [id number]      Dear Juanpablo Paredes

## (undated) NOTE — LETTER
Date & Time: 12/28/2021, 7:09 PM  Patient: Андрей Greenwood  Encounter Provider(s):    ALFRED Grimes       To Whom It May Concern:    Yung Mcgee was seen and treated in our department on 12/28/2021.  He may return to work pending COVID-19 sunita

## (undated) NOTE — LETTER
Date & Time: 1/1/2023, 1:37 PM  Patient: Willma Leaver  Encounter Provider(s):    ALFRED Chandler       To Whom It May Concern:    Adam Hernández was seen and treated in our department on 1/1/2023. He should not return to work until January 4.     If you have any questions or concerns, please do not hesitate to call.        _____________________________  Physician/APC Signature